# Patient Record
Sex: MALE | Race: WHITE
[De-identification: names, ages, dates, MRNs, and addresses within clinical notes are randomized per-mention and may not be internally consistent; named-entity substitution may affect disease eponyms.]

---

## 2018-10-26 ENCOUNTER — HOSPITAL ENCOUNTER (EMERGENCY)
Dept: HOSPITAL 47 - EC | Age: 38
Discharge: HOME | End: 2018-10-26
Payer: COMMERCIAL

## 2018-10-26 VITALS
RESPIRATION RATE: 18 BRPM | DIASTOLIC BLOOD PRESSURE: 84 MMHG | HEART RATE: 77 BPM | SYSTOLIC BLOOD PRESSURE: 137 MMHG | TEMPERATURE: 98.4 F

## 2018-10-26 DIAGNOSIS — N13.2: Primary | ICD-10-CM

## 2018-10-26 DIAGNOSIS — F17.200: ICD-10-CM

## 2018-10-26 LAB
ALBUMIN SERPL-MCNC: 4.2 G/DL (ref 3.5–5)
ALP SERPL-CCNC: 46 U/L (ref 38–126)
ALT SERPL-CCNC: 37 U/L (ref 21–72)
AMYLASE SERPL-CCNC: 54 U/L (ref 30–110)
ANION GAP SERPL CALC-SCNC: 10 MMOL/L
APTT BLD: 24.6 SEC (ref 22–30)
AST SERPL-CCNC: 19 U/L (ref 17–59)
BASOPHILS # BLD AUTO: 0.1 K/UL (ref 0–0.2)
BASOPHILS NFR BLD AUTO: 0 %
BUN SERPL-SCNC: 19 MG/DL (ref 9–20)
CALCIUM SPEC-MCNC: 9.4 MG/DL (ref 8.4–10.2)
CHLORIDE SERPL-SCNC: 107 MMOL/L (ref 98–107)
CO2 SERPL-SCNC: 24 MMOL/L (ref 22–30)
EOSINOPHIL # BLD AUTO: 0.2 K/UL (ref 0–0.7)
EOSINOPHIL NFR BLD AUTO: 2 %
ERYTHROCYTE [DISTWIDTH] IN BLOOD BY AUTOMATED COUNT: 5.59 M/UL (ref 4.3–5.9)
ERYTHROCYTE [DISTWIDTH] IN BLOOD: 13.6 % (ref 11.5–15.5)
GLUCOSE SERPL-MCNC: 130 MG/DL (ref 74–99)
HCT VFR BLD AUTO: 49 % (ref 39–53)
HGB BLD-MCNC: 16.2 GM/DL (ref 13–17.5)
INR PPP: 1 (ref ?–1.2)
LIPASE SERPL-CCNC: 154 U/L (ref 23–300)
LYMPHOCYTES # SPEC AUTO: 2.1 K/UL (ref 1–4.8)
LYMPHOCYTES NFR SPEC AUTO: 17 %
MCH RBC QN AUTO: 29 PG (ref 25–35)
MCHC RBC AUTO-ENTMCNC: 33.1 G/DL (ref 31–37)
MCV RBC AUTO: 87.6 FL (ref 80–100)
MONOCYTES # BLD AUTO: 0.6 K/UL (ref 0–1)
MONOCYTES NFR BLD AUTO: 5 %
NEUTROPHILS # BLD AUTO: 9.2 K/UL (ref 1.3–7.7)
NEUTROPHILS NFR BLD AUTO: 75 %
PH UR: 5.5 [PH] (ref 5–8)
PLATELET # BLD AUTO: 270 K/UL (ref 150–450)
POTASSIUM SERPL-SCNC: 4.5 MMOL/L (ref 3.5–5.1)
PROT SERPL-MCNC: 8 G/DL (ref 6.3–8.2)
PT BLD: 9.8 SEC (ref 9–12)
RBC UR QL: 20 /HPF (ref 0–5)
SODIUM SERPL-SCNC: 141 MMOL/L (ref 137–145)
SP GR UR: 1.03 (ref 1–1.03)
SQUAMOUS UR QL AUTO: <1 /HPF (ref 0–4)
UROBILINOGEN UR QL STRIP: 2 MG/DL (ref ?–2)
WBC # BLD AUTO: 12.3 K/UL (ref 3.8–10.6)
WBC #/AREA URNS HPF: 2 /HPF (ref 0–5)

## 2018-10-26 PROCEDURE — 83690 ASSAY OF LIPASE: CPT

## 2018-10-26 PROCEDURE — 85730 THROMBOPLASTIN TIME PARTIAL: CPT

## 2018-10-26 PROCEDURE — 82150 ASSAY OF AMYLASE: CPT

## 2018-10-26 PROCEDURE — 96374 THER/PROPH/DIAG INJ IV PUSH: CPT

## 2018-10-26 PROCEDURE — 74176 CT ABD & PELVIS W/O CONTRAST: CPT

## 2018-10-26 PROCEDURE — 36415 COLL VENOUS BLD VENIPUNCTURE: CPT

## 2018-10-26 PROCEDURE — 99284 EMERGENCY DEPT VISIT MOD MDM: CPT

## 2018-10-26 PROCEDURE — 85610 PROTHROMBIN TIME: CPT

## 2018-10-26 PROCEDURE — 96361 HYDRATE IV INFUSION ADD-ON: CPT

## 2018-10-26 PROCEDURE — 96375 TX/PRO/DX INJ NEW DRUG ADDON: CPT

## 2018-10-26 PROCEDURE — 85025 COMPLETE CBC W/AUTO DIFF WBC: CPT

## 2018-10-26 PROCEDURE — 81001 URINALYSIS AUTO W/SCOPE: CPT

## 2018-10-26 PROCEDURE — 80053 COMPREHEN METABOLIC PANEL: CPT

## 2018-10-26 PROCEDURE — 74018 RADEX ABDOMEN 1 VIEW: CPT

## 2018-10-26 NOTE — XR
EXAMINATION TYPE: XR KUB

 

DATE OF EXAM: 10/26/2018

 

COMPARISON: NONE

 

HISTORY: Pain

 

TECHNIQUE: Single supine KUB image of the abdomen is obtained

 

FINDINGS:  

Small bowel demonstrates no evidence for dilatation or air fluid levels.  

 

Gas and fecal material is seen in non-distended colon.  

 

No convincing evidence for pneumoperitoneum.

 

 No unusual calcifications. 

 

The lung bases are clear. 

 

The osseous structures are intact.

 

IMPRESSION:  

 

1.  Overall nonobstructive bowel gas pattern.

## 2018-10-26 NOTE — CT
EXAMINATION TYPE: CT abdomen pelvis wo con

 

DATE OF EXAM: 10/26/2018

 

COMPARISON: None

 

INDICATION: Left sided lower quadrant pain

 

DLP: 1123.9 mGycm, Automated exposure control for dose reduction was used.

 

CONTRAST:  0 mL of Isovue 300. 

                        Study performed without Oral Contrast

 

TECHNIQUE: Axial images were obtained from above the diaphragm to the pubic rami in the axial plane a
t 5 mm thick sections.  Reconstructed images are reviewed on the computer in the coronal plane.  

 

FINDINGS:

 

Limited CT sections are obtained the lung bases.  The lung bases are clear.

 

CT ABDOMEN:

 

Liver: Normal

 

Spleen: Normal

 

Pancreas: Normal

 

Adrenal glands: The adrenal glands are normal.

 

Gallbladder: Normal  

 

Kidneys: No masses are evident. Mild left hydronephrosis and minimal left hydroureter is present. No 
cysts are present.  No suspicious renal stones are evident. There is a distal left ureteral stone coy
suring 0.4 cm. Series 201 image 134.

 

Aorta: Normal

 

Inferior vena cava: Normal.

 

CT PELVIS: 

Loops of bowel within the abdomen and pelvis are normal.     Study is performed without oral contrast
 limiting bowel loop evaluation.

 

Appendix: Normal as visualized.

 

Urinary bladder: Decompressed and cannot be evaluated. The distal left ureteral stone measuring 0.4 c
m appears to be above the left ureterovesical junction. 

 

Genitourinary structures: Prostate contains a small amount of calcification.

 

Osseous structures: No suspicious lytic or sclerotic lesions. Mild facet degenerative changes present
 within the lower lumbar spine

 

IMPRESSIONS:

1.  0.4 cm distal left ureteral stone just above the left ureterovesical junction. This is causing mi
ld left hydronephrosis and hydroureter. Correlate with location of the patient's pain.

## 2018-10-26 NOTE — ED
General Adult HPI





- General


Chief complaint: Abdominal Pain


Stated complaint: ABD PAIN


Time Seen by Provider: 10/26/18 07:17


Source: patient, RN notes reviewed


Mode of arrival: ambulatory


Limitations: no limitations





- History of Present Illness


Initial comments: 





Patient is a pleasant 38-year-old male presenting to the emergency Department 

with complaints of left flank pain.  Onset of symptoms was yesterday around 3:

30 AM.  Patient had sudden sharp left sided flank discomfort that was severe.  

Discomfort lasted for a few hours and then was mild throughout the day.  

Discomfort started severe again prior to arrival.  No history of similar 

symptoms previously.  No constipation or diarrhea.  Patient has had some 

associated nausea and vomiting.  Patient states it is difficult to get 

comfortable.





- Related Data


 Home Medications











 Medication  Instructions  Recorded  Confirmed


 


Multivitamins, Thera [Multivitamin 1 tab PO DAILY 10/26/18 10/26/18





(formulary)]   


 


Naproxen [Naprosyn] 500 mg PO Q12HR PRN 10/26/18 10/26/18








 Previous Rx's











 Medication  Instructions  Recorded


 


Ketorolac [Toradol] 10 mg PO Q6HR PRN #15 tab 10/26/18


 


Metoclopramide HCl [Reglan] 10 mg PO Q6HR PRN #15 tablet 10/26/18











 Allergies











Allergy/AdvReac Type Severity Reaction Status Date / Time


 


No Known Allergies Allergy   Verified 10/26/18 08:21














Review of Systems


ROS Statement: 


Those systems with pertinent positive or pertinent negative responses have been 

documented in the HPI.





ROS Other: All systems not noted in ROS Statement are negative.


Constitutional: Denies: fever, chills


Eyes: Denies: eye pain


ENT: Denies: ear pain


Respiratory: Denies: cough


Cardiovascular: Denies: chest pain


Endocrine: Denies: fatigue


Gastrointestinal: Reports: abdominal pain, nausea, vomiting


Genitourinary: Denies: dysuria, hematuria


Musculoskeletal: Denies: back pain


Skin: Denies: rash


Neurological: Denies: weakness





Past Medical History


Additional Past Medical History / Comment(s): knee pain, elbow pain


History of Any Multi-Drug Resistant Organisms: None Reported


Past Surgical History: Orthopedic Surgery


Additional Past Surgical History / Comment(s): deviated septum


Past Psychological History: No Psychological Hx Reported


Smoking Status: Current every day smoker


Past Alcohol Use History: Occasional


Past Drug Use History: None Reported





General Exam


Limitations: no limitations


General appearance: alert


Head exam: Present: atraumatic


Eye exam: Present: normal appearance, PERRL


ENT exam: Present: normal oropharynx


Neck exam: Present: normal inspection


Respiratory exam: Present: normal lung sounds bilaterally


Cardiovascular Exam: Present: regular rate, normal rhythm


  ** Expanded


Peripheral pulses: 2+: Dorsalis Pedis (R), Dorsalis Pedis (L)


GI/Abdominal exam: Present: soft, normal bowel sounds.  Absent: distended, 

tenderness, guarding, rebound, rigid, pulsatile mass


Extremities exam: Present: normal inspection


Back exam: Present: normal inspection.  Absent: tenderness, CVA tenderness (L)


Neurological exam: Present: alert


Psychiatric exam: Present: normal affect, normal mood


Skin exam: Present: normal color





Course


 Vital Signs











  10/26/18 10/26/18





  06:43 08:41


 


Temperature 98.6 F 98.4 F


 


Pulse Rate 96 77


 


Respiratory 20 18





Rate  


 


Blood Pressure 169/106 137/84


 


O2 Sat by Pulse 98 95





Oximetry  














Medical Decision Making





- Medical Decision Making





Patient reevaluated and resting comfortably in bed.  Patient states discomfort 

has improved and is tolerable.  Patient is updated on results and is 

comfortable with discharge home.





- Lab Data


Result diagrams: 


 10/26/18 07:30





 10/26/18 07:30


 Lab Results











  10/26/18 10/26/18 10/26/18 Range/Units





  07:30 07:30 07:30 


 


WBC   12.3 H   (3.8-10.6)  k/uL


 


RBC   5.59   (4.30-5.90)  m/uL


 


Hgb   16.2   (13.0-17.5)  gm/dL


 


Hct   49.0   (39.0-53.0)  %


 


MCV   87.6   (80.0-100.0)  fL


 


MCH   29.0   (25.0-35.0)  pg


 


MCHC   33.1   (31.0-37.0)  g/dL


 


RDW   13.6   (11.5-15.5)  %


 


Plt Count   270   (150-450)  k/uL


 


Neutrophils %   75   %


 


Lymphocytes %   17   %


 


Monocytes %   5   %


 


Eosinophils %   2   %


 


Basophils %   0   %


 


Neutrophils #   9.2 H   (1.3-7.7)  k/uL


 


Lymphocytes #   2.1   (1.0-4.8)  k/uL


 


Monocytes #   0.6   (0-1.0)  k/uL


 


Eosinophils #   0.2   (0-0.7)  k/uL


 


Basophils #   0.1   (0-0.2)  k/uL


 


PT    9.8  (9.0-12.0)  sec


 


INR    1.0  (<1.2)  


 


APTT    24.6  (22.0-30.0)  sec


 


Sodium  141    (137-145)  mmol/L


 


Potassium  4.5    (3.5-5.1)  mmol/L


 


Chloride  107    ()  mmol/L


 


Carbon Dioxide  24    (22-30)  mmol/L


 


Anion Gap  10    mmol/L


 


BUN  19    (9-20)  mg/dL


 


Creatinine  1.15    (0.66-1.25)  mg/dL


 


Est GFR (CKD-EPI)AfAm  >90    (>60 ml/min/1.73 sqM)  


 


Est GFR (CKD-EPI)NonAf  80    (>60 ml/min/1.73 sqM)  


 


Glucose  130 H    (74-99)  mg/dL


 


Calcium  9.4    (8.4-10.2)  mg/dL


 


Total Bilirubin  0.6    (0.2-1.3)  mg/dL


 


AST  19    (17-59)  U/L


 


ALT  37    (21-72)  U/L


 


Alkaline Phosphatase  46    ()  U/L


 


Total Protein  8.0    (6.3-8.2)  g/dL


 


Albumin  4.2    (3.5-5.0)  g/dL


 


Amylase  54    ()  U/L


 


Lipase  154    ()  U/L


 


Urine Color     


 


Urine Appearance     (Clear)  


 


Urine pH     (5.0-8.0)  


 


Ur Specific Gravity     (1.001-1.035)  


 


Urine Protein     (Negative)  


 


Urine Glucose (UA)     (Negative)  


 


Urine Ketones     (Negative)  


 


Urine Blood     (Negative)  


 


Urine Nitrite     (Negative)  


 


Urine Bilirubin     (Negative)  


 


Urine Urobilinogen     (<2.0)  mg/dL


 


Ur Leukocyte Esterase     (Negative)  


 


Urine RBC     (0-5)  /hpf


 


Urine WBC     (0-5)  /hpf


 


Ur Squamous Epith Cells     (0-4)  /hpf


 


Urine Mucus     (None)  /hpf














  10/26/18 Range/Units





  07:44 


 


WBC   (3.8-10.6)  k/uL


 


RBC   (4.30-5.90)  m/uL


 


Hgb   (13.0-17.5)  gm/dL


 


Hct   (39.0-53.0)  %


 


MCV   (80.0-100.0)  fL


 


MCH   (25.0-35.0)  pg


 


MCHC   (31.0-37.0)  g/dL


 


RDW   (11.5-15.5)  %


 


Plt Count   (150-450)  k/uL


 


Neutrophils %   %


 


Lymphocytes %   %


 


Monocytes %   %


 


Eosinophils %   %


 


Basophils %   %


 


Neutrophils #   (1.3-7.7)  k/uL


 


Lymphocytes #   (1.0-4.8)  k/uL


 


Monocytes #   (0-1.0)  k/uL


 


Eosinophils #   (0-0.7)  k/uL


 


Basophils #   (0-0.2)  k/uL


 


PT   (9.0-12.0)  sec


 


INR   (<1.2)  


 


APTT   (22.0-30.0)  sec


 


Sodium   (137-145)  mmol/L


 


Potassium   (3.5-5.1)  mmol/L


 


Chloride   ()  mmol/L


 


Carbon Dioxide   (22-30)  mmol/L


 


Anion Gap   mmol/L


 


BUN   (9-20)  mg/dL


 


Creatinine   (0.66-1.25)  mg/dL


 


Est GFR (CKD-EPI)AfAm   (>60 ml/min/1.73 sqM)  


 


Est GFR (CKD-EPI)NonAf   (>60 ml/min/1.73 sqM)  


 


Glucose   (74-99)  mg/dL


 


Calcium   (8.4-10.2)  mg/dL


 


Total Bilirubin   (0.2-1.3)  mg/dL


 


AST   (17-59)  U/L


 


ALT   (21-72)  U/L


 


Alkaline Phosphatase   ()  U/L


 


Total Protein   (6.3-8.2)  g/dL


 


Albumin   (3.5-5.0)  g/dL


 


Amylase   ()  U/L


 


Lipase   ()  U/L


 


Urine Color  Yellow  


 


Urine Appearance  Clear  (Clear)  


 


Urine pH  5.5  (5.0-8.0)  


 


Ur Specific Gravity  1.026  (1.001-1.035)  


 


Urine Protein  Trace H  (Negative)  


 


Urine Glucose (UA)  Negative  (Negative)  


 


Urine Ketones  Negative  (Negative)  


 


Urine Blood  Moderate H  (Negative)  


 


Urine Nitrite  Negative  (Negative)  


 


Urine Bilirubin  Negative  (Negative)  


 


Urine Urobilinogen  2.0  (<2.0)  mg/dL


 


Ur Leukocyte Esterase  Negative  (Negative)  


 


Urine RBC  20 H  (0-5)  /hpf


 


Urine WBC  2  (0-5)  /hpf


 


Ur Squamous Epith Cells  <1  (0-4)  /hpf


 


Urine Mucus  Moderate H  (None)  /hpf














- Radiology Data


Radiology results: report reviewed (4 mm stone left UVJ)


Interpreted by me: 





KUB reveals no acute process





Disposition


Clinical Impression: 


 Ureterolithiasis





Disposition: HOME SELF-CARE


Condition: Stable


Instructions:  Kidney Stones (ED)


Additional Instructions: 


Please follow-up with primary care physician in the next couple days for 

recheck.  Please also follow-up with urology for continued symptoms.  Return 

for fever, uncontrolled pain, uncontrolled vomiting, worsening or changing 

symptoms or other concerns.  Do not take ketorolac with naproxen.


Prescriptions: 


Ketorolac [Toradol] 10 mg PO Q6HR PRN #15 tab


 PRN Reason: Pain


Metoclopramide HCl [Reglan] 10 mg PO Q6HR PRN #15 tablet


 PRN Reason: Nausea


Is patient prescribed a controlled substance at d/c from ED?: No


Referrals: 


Cesar Suarez MD [Primary Care Provider] - 1-2 days


Time of Disposition: 08:51 Declines